# Patient Record
(demographics unavailable — no encounter records)

---

## 2024-10-22 NOTE — CARDIOLOGY SUMMARY
[de-identified] :  Echo 6/5/24: 1. Left ventricular cavity is normal in size. Left ventricular wall thickness is normal. Left ventricular systolic function is normal with an ejection fraction visually estimated at 60 to 65 %. There are no regional wall motion abnormalities seen. 2. Normal left ventricular diastolic function, with normal filling pressure. 3. Based on visual assessment, the right ventricle appears mildly enlarged. normal systolic function. 4. The right atrium is mildly dilated. 5. No significant valvular disease. 6. Estimated pulmonary artery systolic pressure is 28 mmHg, consistent with normal pulmonary artery pressure. 7. No pericardial effusion seen.   [de-identified] : Stress Echo: 10/14/24: 1. Normal exercise stress echocardiogram. with normal augmentation of left ventricular systolic function. 2. No exercise induced electrocardiographic evidence of myocardial ischemia. 3. Physiologic blood pressure response to exercise. 4. No significant exercise-induced arrhythmias. 5. Patient achieved 15.6 METs, which is consistent with excellent exercise capacity. ELECTROCARDIOGRAPHIC FINDINGS: Pre-Stress: At rest, the electrocardiogram revealed sinus rhythm. Post-Stress: There were no significant exercise induced arrhythmias noted. FOCUSED ECHOCARDIOGRAPHIC FINDINGS: Baseline Echocardiogram: Baseline/resting echocardiogram reveals normal left ventricular segmental wall motion and systolic function. At rest there were no left ventricular regional wall motion abnormalities observed. At rest, the baseline left ventricular ejection fraction was was estimated at approximately 60% (normal EF). Stress Echocardiogram: Stress echocardiogram reveals normal left ventricular segmental wall motion and systolic function. Immediate post-stress there were no regional wall motion abnormalities seen. Immediate post-stress the left ventricular ejection fraction was estimated at approximately 70% (normal EF).   Exercise Stress Test 5/4/2022: DADA for 8:12 min:s, Max. METS: 12:40. HR and BP with appropriate response to exercise. NO EVIDENCE OF EXERCISE INDUCED ISHCEMIA TO ATTAINED HR.  [de-identified] :   CT CORONARY SCORING 6/5/24: Coronary Arteries  Total: 897  LM: 98.4  LAD: 446  LCx: 113  RCA: 239 Cardiac:  1.  The calcium score is severe at 897 Agatston units, which is at the 97 percentile, adjusted for age, gender and race.  2.  Presence of aortic valve and aortic calcification.  Non-cardiac:  Aortic valve and thoracic aortic calcifications.  Right lower lobe nodule compatible with a calcified granuloma.

## 2024-10-22 NOTE — ASSESSMENT
[FreeTextEntry1] : 59M  Elevated Coronary Artery Calcium Score HLD Fhx: CAD  EKG NSR non-specific T wave inversions inferiorly Treadmill stress echo May 2024- no ischemia, 15.6 METS Coronary calcium Score 897  - continue high dose crestor 40mg - target LDL < 70.   Repeat lipids ~ Dec 2024  - continue aspirin 81mg given coronary plaque burden - discussed results of testing at length, he is asymptomatic with excellent functional capacity and normal LVEF/wall motion.  He does have a significant calcified plaque burden with a strong family hx of CAD/MI.  Recommend coronary CTA to rule out critical LM/LAD/3-vessel disease.    - return post testing

## 2024-10-22 NOTE — CARDIOLOGY SUMMARY
[de-identified] :  Echo 6/5/24: 1. Left ventricular cavity is normal in size. Left ventricular wall thickness is normal. Left ventricular systolic function is normal with an ejection fraction visually estimated at 60 to 65 %. There are no regional wall motion abnormalities seen. 2. Normal left ventricular diastolic function, with normal filling pressure. 3. Based on visual assessment, the right ventricle appears mildly enlarged. normal systolic function. 4. The right atrium is mildly dilated. 5. No significant valvular disease. 6. Estimated pulmonary artery systolic pressure is 28 mmHg, consistent with normal pulmonary artery pressure. 7. No pericardial effusion seen.   [de-identified] : Stress Echo: 10/14/24: 1. Normal exercise stress echocardiogram. with normal augmentation of left ventricular systolic function. 2. No exercise induced electrocardiographic evidence of myocardial ischemia. 3. Physiologic blood pressure response to exercise. 4. No significant exercise-induced arrhythmias. 5. Patient achieved 15.6 METs, which is consistent with excellent exercise capacity. ELECTROCARDIOGRAPHIC FINDINGS: Pre-Stress: At rest, the electrocardiogram revealed sinus rhythm. Post-Stress: There were no significant exercise induced arrhythmias noted. FOCUSED ECHOCARDIOGRAPHIC FINDINGS: Baseline Echocardiogram: Baseline/resting echocardiogram reveals normal left ventricular segmental wall motion and systolic function. At rest there were no left ventricular regional wall motion abnormalities observed. At rest, the baseline left ventricular ejection fraction was was estimated at approximately 60% (normal EF). Stress Echocardiogram: Stress echocardiogram reveals normal left ventricular segmental wall motion and systolic function. Immediate post-stress there were no regional wall motion abnormalities seen. Immediate post-stress the left ventricular ejection fraction was estimated at approximately 70% (normal EF).   Exercise Stress Test 5/4/2022: DADA for 8:12 min:s, Max. METS: 12:40. HR and BP with appropriate response to exercise. NO EVIDENCE OF EXERCISE INDUCED ISHCEMIA TO ATTAINED HR.  [de-identified] :   CT CORONARY SCORING 6/5/24: Coronary Arteries  Total: 897  LM: 98.4  LAD: 446  LCx: 113  RCA: 239 Cardiac:  1.  The calcium score is severe at 897 Agatston units, which is at the 97 percentile, adjusted for age, gender and race.  2.  Presence of aortic valve and aortic calcification.  Non-cardiac:  Aortic valve and thoracic aortic calcifications.  Right lower lobe nodule compatible with a calcified granuloma.

## 2024-10-22 NOTE — HISTORY OF PRESENT ILLNESS
[FreeTextEntry1] : 58 M patient of Dr. Kenney here today to transition care. Patient established care with Dr. Kenney because of HLD and a very strong family history of early coronary artery disease, especially his father who underwent bypass and his sister who had a heart attack at a very young age. The patient himself has been entirely asymptomatic as of last visit with Dr. Kenney.   Very active no limitations.  Has had a couple episodes where he felt panicky/sob during  drills with mask on, smoke filled room, wearing heavy equipment.  Self resolved.  He thinks it was panic related.  Known anxiety disorder, follows with therapist weekly.  Exercises regularly without these symptoms. Tolerates aspirin/crestor 20mg no issues Weight has been stable.  Working on losing weight, diet is major obstacle.    10/22/24: returns post testing to discuss calcium score and stress test.  no further episodes of chest pain.  exercises regularly without limitation.

## 2024-12-19 NOTE — PHYSICAL EXAM
[Well Developed] : well developed [Well Nourished] : well nourished [No Acute Distress] : no acute distress [Normal Conjunctiva] : normal conjunctiva [Normal Venous Pressure] : normal venous pressure [No Carotid Bruit] : no carotid bruit [Normal S1, S2] : normal S1, S2 [No Murmur] : no murmur [No Rub] : no rub [No Gallop] : no gallop [Clear Lung Fields] : clear lung fields [Good Air Entry] : good air entry [No Respiratory Distress] : no respiratory distress  [Soft] : abdomen soft [Non Tender] : non-tender [No Masses/organomegaly] : no masses/organomegaly [Normal Bowel Sounds] : normal bowel sounds [Moves all extremities] : moves all extremities [No Focal Deficits] : no focal deficits [Normal Speech] : normal speech [No ulcers] : no ulcers [No edema] : no edema [No varicosities] : no varicosities [No chronic venous stasis changes] : no chronic venous stasis changes [No cyanosis] : no cyanosis [No rashes] : no rashes [Normal peripheral pulses] : : normal peripheral pulses [de-identified] : R radial access site intact, normal pulses, mild ecchymoses

## 2024-12-19 NOTE — CARDIOLOGY SUMMARY
[de-identified] :  Echo 6/5/24: 1. Left ventricular cavity is normal in size. Left ventricular wall thickness is normal. Left ventricular systolic function is normal with an ejection fraction visually estimated at 60 to 65 %. There are no regional wall motion abnormalities seen. 2. Normal left ventricular diastolic function, with normal filling pressure. 3. Based on visual assessment, the right ventricle appears mildly enlarged. normal systolic function. 4. The right atrium is mildly dilated. 5. No significant valvular disease. 6. Estimated pulmonary artery systolic pressure is 28 mmHg, consistent with normal pulmonary artery pressure. 7. No pericardial effusion seen.   [de-identified] : Stress Echo: 10/14/24: 1. Normal exercise stress echocardiogram. with normal augmentation of left ventricular systolic function. 2. No exercise induced electrocardiographic evidence of myocardial ischemia. 3. Physiologic blood pressure response to exercise. 4. No significant exercise-induced arrhythmias. 5. Patient achieved 15.6 METs, which is consistent with excellent exercise capacity. ELECTROCARDIOGRAPHIC FINDINGS: Pre-Stress: At rest, the electrocardiogram revealed sinus rhythm. Post-Stress: There were no significant exercise induced arrhythmias noted. FOCUSED ECHOCARDIOGRAPHIC FINDINGS: Baseline Echocardiogram: Baseline/resting echocardiogram reveals normal left ventricular segmental wall motion and systolic function. At rest there were no left ventricular regional wall motion abnormalities observed. At rest, the baseline left ventricular ejection fraction was was estimated at approximately 60% (normal EF). Stress Echocardiogram: Stress echocardiogram reveals normal left ventricular segmental wall motion and systolic function. Immediate post-stress there were no regional wall motion abnormalities seen. Immediate post-stress the left ventricular ejection fraction was estimated at approximately 70% (normal EF).   Exercise Stress Test 5/4/2022: DADA for 8:12 min:s, Max. METS: 12:40. HR and BP with appropriate response to exercise. NO EVIDENCE OF EXERCISE INDUCED ISHCEMIA TO ATTAINED HR.  [de-identified] : CTA Heart 11/12/24: Total Agatston Score: 793.  LM Agatston Score: 95.  LAD Agatston Score: 422.  LCX Agatston Score: 99.  RCA Agatston Score: 177. Cardiac:  1.  The calcium score is severe at 793 Agatston units, which is at the 96 percentile, adjusted for age, gender and race.  2.  Severe stenosis in distal RCA, and mid LCx.  3.  Moderate to severe stenosis in proximal LCx.  4.  Moderate stenosis in mid LAD.  5.  Calcific plaque obscures the lumen in mid LAD, precluding stenosis evaluation.  6.  Remaining segments demonstrate non obstructive coronary artery disease.  Based on the coronary findings, FFR-CT Coronary Analysis ([employer]) is recommended to define the physiologic significance of obstructive coronary disease. After insurance approval, CCTA images will be sent for analysis. FFR CT results are then generally available within 24 hours(if technical quality of the images is sufficient).  Non-cardiac:  Aortic valve calcification.  A couple of hypervascular liver foci measuring up to 7 mm. Follow-up MRI with hepatic mass protocol/with and without IV contrast is recommended.  PCI of dLCx at [facility] 12/12/24: Successful AngioFFR positive for obstruction. [id number] Xience MARYBEL. 0% residual stenosis.   CT CORONARY SCORING 6/5/24: Coronary Arteries  Total: 897  LM: 98.4  LAD: 446  LCx: 113  RCA: 239 Cardiac:  1.  The calcium score is severe at 897 Agatston units, which is at the 97 percentile, adjusted for age, gender and race.  2.  Presence of aortic valve and aortic calcification.  Non-cardiac:  Aortic valve and thoracic aortic calcifications.  Right lower lobe nodule compatible with a calcified granuloma.

## 2024-12-19 NOTE — ASSESSMENT
[FreeTextEntry1] : 59M  Severe CAD s/p distal LCX PCI Dec 2024 at [facility]  Elevated Coronary Artery Calcium Score HLD Fhx: CAD  EKG for CAD - sinus bradycardia low 40s Treadmill Stress Echo May 2024- no ischemia, 15.6 METS Coronary calcium Score 897  Reviewed recent coronary angiogram.   S/p LCX PCI.    - continue high dose crestor 40mg - LDL 58, HDL 54, TG 88, Total 130 - continue aspirin 81mg and plavix 75mg given coronary plaque burden recent LCX PCI

## 2024-12-19 NOTE — HISTORY OF PRESENT ILLNESS
[FreeTextEntry1] : 59M patient of Dr. Kenney here today to transition care. Patient established care with Dr. Kenney because of HLD and a very strong family history of early coronary artery disease, especially his father who underwent bypass and his sister who had a heart attack at a very young age. The patient himself has been entirely asymptomatic as of last visit with Dr. Kenney.   Very active no limitations.  Has had a couple episodes where he felt panicky/sob during  drills with mask on, smoke filled room, wearing heavy equipment.  Self resolved.  He thinks it was panic related.  Known anxiety disorder, follows with therapist weekly.  Exercises regularly without these symptoms. Tolerates aspirin/crestor 20mg no issues Weight has been stable.  Working on losing weight, diet is major obstacle.    10/22/24: returns post testing to discuss calcium score and stress test.  no further episodes of chest pain.  exercises regularly without limitation.   12/19/24:  since last visit had coronary angiogram, severe LCX s/p PCI.   He feels good, no limitations.   Exercising regularly.   R radial artery access no complicatrions.

## 2024-12-30 NOTE — ASSESSMENT
[FreeTextEntry1] : CPE- healthy person up-to-date on health maintenance measures. Also up-to-date on recommended vaccines. Urged weight loss through proper diet and regular exercising.  Mixed hyperlipidemia - Check profile. Congratulated on further wt loss. Low chol diet re-reviewed.  Continue rosuvastatin 40 mg daily.  Last LDL at goal 58  December 2024.    Sinus bradycardia - Bradycardia chronic and likely physiologic as pt is triathalon participant. No sx's. Check ekg.  Coronary artery disease- 12/12/2024 status post left circumflex PCI.  He continues without symptoms. Numerous cardiology notes reviewed with patient today. Plan is to continue present meds. Will check LFTs, TSH, CBC, and PSA as other labs recently checked 2 weeks ago.  Give Pneumovax 23 for CAD.  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  Declines HIV.  Risks/benefits of Pneumovax 23 vaccine d/w patient and patient understands and accepts.

## 2024-12-30 NOTE — HEALTH RISK ASSESSMENT
[Never] : Never [Yes] : Yes [Monthly or less (1 pt)] : Monthly or less (1 point) [1 or 2 (0 pts)] : 1 or 2 (0 points) [Never (0 pts)] : Never (0 points) [No] : In the past 12 months have you used drugs other than those required for medical reasons? No [Audit-CScore] : 1 [HIV test declined] : HIV test declined [HIVDate] : 12/24

## 2024-12-30 NOTE — PHYSICAL EXAM
[Well Nourished] : well nourished [Well Developed] : well developed [Well-Appearing] : well-appearing [Normal Sclera/Conjunctiva] : normal sclera/conjunctiva [PERRL] : pupils equal round and reactive to light [EOMI] : extraocular movements intact [Normal Outer Ear/Nose] : the outer ears and nose were normal in appearance [Normal Oropharynx] : the oropharynx was normal [No JVD] : no jugular venous distention [No Lymphadenopathy] : no lymphadenopathy [Supple] : supple [Thyroid Normal, No Nodules] : the thyroid was normal and there were no nodules present [No Respiratory Distress] : no respiratory distress  [No Accessory Muscle Use] : no accessory muscle use [Clear to Auscultation] : lungs were clear to auscultation bilaterally [Normal Rate] : normal rate  [Regular Rhythm] : with a regular rhythm [Normal S1, S2] : normal S1 and S2 [No Murmur] : no murmur heard [No Carotid Bruits] : no carotid bruits [No Abdominal Bruit] : a ~M bruit was not heard ~T in the abdomen [No Varicosities] : no varicosities [Pedal Pulses Present] : the pedal pulses are present [No Edema] : there was no peripheral edema [No Palpable Aorta] : no palpable aorta [No Extremity Clubbing/Cyanosis] : no extremity clubbing/cyanosis [Soft] : abdomen soft [Non Tender] : non-tender [Non-distended] : non-distended [No Masses] : no abdominal mass palpated [No HSM] : no HSM [Normal Bowel Sounds] : normal bowel sounds [No CVA Tenderness] : no CVA  tenderness [No Spinal Tenderness] : no spinal tenderness [No Joint Swelling] : no joint swelling [Grossly Normal Strength/Tone] : grossly normal strength/tone [No Rash] : no rash [Coordination Grossly Intact] : coordination grossly intact [No Focal Deficits] : no focal deficits [Normal Gait] : normal gait [Deep Tendon Reflexes (DTR)] : deep tendon reflexes were 2+ and symmetric [Normal Affect] : the affect was normal [Normal Insight/Judgement] : insight and judgment were intact [de-identified] : no xanthelasmas [de-identified] : +healed laparascopic scars and horizontal incision

## 2024-12-30 NOTE — HISTORY OF PRESENT ILLNESS
[FreeTextEntry1] : 59-year-old man here for CPE Had coronary stent placed 12/12/2024 [de-identified] : Feels well. Had stent put in stent put in 3 weeks ago. Lost 6 pounds in 1 year.

## 2024-12-30 NOTE — REVIEW OF SYSTEMS
[Recent Change In Weight] : ~T recent weight change [Hearing Loss] : hearing loss [Frequency] : frequency [Anxiety] : anxiety [Fever] : no fever [Chills] : no chills [Vision Problems] : no vision problems [Chest Pain] : no chest pain [Palpitations] : no palpitations [Shortness Of Breath] : no shortness of breath [Wheezing] : no wheezing [Cough] : no cough [Dyspnea on Exertion] : not dyspnea on exertion [Abdominal Pain] : no abdominal pain [Nausea] : no nausea [Vomiting] : no vomiting [Heartburn] : no heartburn [Dysuria] : no dysuria [Nocturia] : no nocturia [Hematuria] : no hematuria [Muscle Weakness] : no muscle weakness [Joint Swelling] : no joint swelling [Skin Rash] : no skin rash [Headache] : no headache [Dizziness] : no dizziness [Confusion] : no confusion [Memory Loss] : no memory loss [Depression] : no depression [Easy Bleeding] : no easy bleeding [FreeTextEntry3] : last eye exam 7/2024

## 2025-05-01 NOTE — PHYSICAL EXAM
[General Appearance - Alert] : alert [General Appearance - In No Acute Distress] : in no acute distress [Jugular Venous Distention Increased] : there was no jugular-venous distention [] : no respiratory distress [Respiration, Rhythm And Depth] : normal respiratory rhythm and effort [Exaggerated Use Of Accessory Muscles For Inspiration] : no accessory muscle use [Auscultation Breath Sounds / Voice Sounds] : lungs were clear to auscultation bilaterally [Heart Rate And Rhythm] : heart rate was normal and rhythm regular [Heart Sounds] : normal S1 and S2 [Abdomen Soft] : soft [Abdomen Tenderness] : non-tender [Oriented To Time, Place, And Person] : oriented to person, place, and time

## 2025-05-01 NOTE — HISTORY OF PRESENT ILLNESS
[FreeTextEntry1] : He has hx of hip pain from OA, CAD s/p with stent Dec 2024 at [facility]. Kidney donor in 2017, with normal kidney function on FU at [place], post donation  on 1/2018 cr 1.4, egfr 60s.  eGFR high 50s- 60s since 2018. No available lab prior to  kidney donation. Most recent lab Dec 2024, cr 1.3, egfr 63.  Chronic use of advil for hip pain. Hip joint injection postponed due to patient being on DAPt. very active, golfing.

## 2025-05-01 NOTE — REVIEW OF SYSTEMS
[Fever] : no fever [Chills] : no chills [Chest Pain] : no chest pain [Palpitations] : no palpitations [Cough] : no cough [SOB on Exertion] : no shortness of breath during exertion [Abdominal Pain] : no abdominal pain [Vomiting] : no vomiting [Constipation] : no constipation [Diarrhea] : no diarrhea [Dysuria] : no dysuria [Incontinence] : no incontinence [Hesitancy] : no urinary hesitancy [Nocturia] : no nocturia [Dizziness] : no dizziness

## 2025-05-01 NOTE — ASSESSMENT
[FreeTextEntry1] : # Kidney Donor 2017 #CKD stage 2-3 -Most recent lab Dec 2024, cr 1.3, egfr 63. at baseline  -Avoid NSAID, use alternative like tylenol, lidocaine patch, PT. Advise to discuss with cardio on when anti plt can be held for procedure/surgery  - Keep good hydration, fluid intake >2l/d, he is physically very active  - Renal US  -lab cbc, cmp, ua, upcr, vit d, pth, phos, uric   # HLD  #CAD  -cont rosuvastatin 40 mg daily

## 2025-06-10 NOTE — PHYSICAL EXAM
[Well Developed] : well developed [Well Nourished] : well nourished [No Acute Distress] : no acute distress [Normal Conjunctiva] : normal conjunctiva [Normal Venous Pressure] : normal venous pressure [No Carotid Bruit] : no carotid bruit [Normal S1, S2] : normal S1, S2 [No Murmur] : no murmur [No Rub] : no rub [No Gallop] : no gallop [Clear Lung Fields] : clear lung fields [Good Air Entry] : good air entry [No Respiratory Distress] : no respiratory distress  [Soft] : abdomen soft [Non Tender] : non-tender [No Masses/organomegaly] : no masses/organomegaly [Normal Bowel Sounds] : normal bowel sounds [Moves all extremities] : moves all extremities [Normal Speech] : normal speech [No Focal Deficits] : no focal deficits [No edema] : no edema [No ulcers] : no ulcers [No varicosities] : no varicosities [No cyanosis] : no cyanosis [No chronic venous stasis changes] : no chronic venous stasis changes [No rashes] : no rashes [Normal peripheral pulses] : : normal peripheral pulses [de-identified] : R radial access site intact, normal pulses, mild ecchymoses

## 2025-06-10 NOTE — CARDIOLOGY SUMMARY
[de-identified] :  Echo 6/5/24: 1. Left ventricular cavity is normal in size. Left ventricular wall thickness is normal. Left ventricular systolic function is normal with an ejection fraction visually estimated at 60 to 65 %. There are no regional wall motion abnormalities seen. 2. Normal left ventricular diastolic function, with normal filling pressure. 3. Based on visual assessment, the right ventricle appears mildly enlarged. normal systolic function. 4. The right atrium is mildly dilated. 5. No significant valvular disease. 6. Estimated pulmonary artery systolic pressure is 28 mmHg, consistent with normal pulmonary artery pressure. 7. No pericardial effusion seen.   [de-identified] : Stress Echo: 10/14/24: 1. Normal exercise stress echocardiogram. with normal augmentation of left ventricular systolic function. 2. No exercise induced electrocardiographic evidence of myocardial ischemia. 3. Physiologic blood pressure response to exercise. 4. No significant exercise-induced arrhythmias. 5. Patient achieved 15.6 METs, which is consistent with excellent exercise capacity. ELECTROCARDIOGRAPHIC FINDINGS: Pre-Stress: At rest, the electrocardiogram revealed sinus rhythm. Post-Stress: There were no significant exercise induced arrhythmias noted. FOCUSED ECHOCARDIOGRAPHIC FINDINGS: Baseline Echocardiogram: Baseline/resting echocardiogram reveals normal left ventricular segmental wall motion and systolic function. At rest there were no left ventricular regional wall motion abnormalities observed. At rest, the baseline left ventricular ejection fraction was was estimated at approximately 60% (normal EF). Stress Echocardiogram: Stress echocardiogram reveals normal left ventricular segmental wall motion and systolic function. Immediate post-stress there were no regional wall motion abnormalities seen. Immediate post-stress the left ventricular ejection fraction was estimated at approximately 70% (normal EF).   Exercise Stress Test 5/4/2022: DADA for 8:12 min:s, Max. METS: 12:40. HR and BP with appropriate response to exercise. NO EVIDENCE OF EXERCISE INDUCED ISHCEMIA TO ATTAINED HR.  [de-identified] : CTA Heart 11/12/24: Total Agatston Score: 793.  LM Agatston Score: 95.  LAD Agatston Score: 422.  LCX Agatston Score: 99.  RCA Agatston Score: 177. Cardiac:  1.  The calcium score is severe at 793 Agatston units, which is at the 96 percentile, adjusted for age, gender and race.  2.  Severe stenosis in distal RCA, and mid LCx.  3.  Moderate to severe stenosis in proximal LCx.  4.  Moderate stenosis in mid LAD.  5.  Calcific plaque obscures the lumen in mid LAD, precluding stenosis evaluation.  6.  Remaining segments demonstrate non obstructive coronary artery disease.  Based on the coronary findings, FFR-CT Coronary Analysis ([employer]) is recommended to define the physiologic significance of obstructive coronary disease. After insurance approval, CCTA images will be sent for analysis. FFR CT results are then generally available within 24 hours(if technical quality of the images is sufficient).  Non-cardiac:  Aortic valve calcification.  A couple of hypervascular liver foci measuring up to 7 mm. Follow-up MRI with hepatic mass protocol/with and without IV contrast is recommended.  PCI of dLCx at [facility] 12/12/24: Successful AngioFFR positive for obstruction. [id number] Xience MARYBEL. 0% residual stenosis.   CT CORONARY SCORING 6/5/24: Coronary Arteries  Total: 897  LM: 98.4  LAD: 446  LCx: 113  RCA: 239 Cardiac:  1.  The calcium score is severe at 897 Agatston units, which is at the 97 percentile, adjusted for age, gender and race.  2.  Presence of aortic valve and aortic calcification.  Non-cardiac:  Aortic valve and thoracic aortic calcifications.  Right lower lobe nodule compatible with a calcified granuloma.

## 2025-06-10 NOTE — ASSESSMENT
[FreeTextEntry1] : 59M  Severe CAD s/p distal LCX PCI Dec 2024 at [facility]  Elevated Coronary Artery Calcium Score HLD Fhx: CAD  EKG for CAD - sinus bradycardia 40s unchanged Treadmill Stress Echo May 2024- no ischemia, 15.6 METS Coronary calcium Score 897   - no signs of ischemia or heart failure - continue high dose crestor 40mg for HLD/CAD - LDL 58, HDL 54, TG 88, Total 130 - continue aspirin 81mg and plavix 75mg given coronary plaque burden and LCX PCI - return for preop likely in 6 months

## 2025-06-10 NOTE — HISTORY OF PRESENT ILLNESS
[FreeTextEntry1] : 59M patient of Dr. Kenney here today to transition care. Patient established care with Dr. Kenney because of HLD and a very strong family history of early coronary artery disease, especially his father who underwent bypass and his sister who had a heart attack at a very young age. The patient himself has been entirely asymptomatic as of last visit with Dr. Kenney.   Very active no limitations.  Has had a couple episodes where he felt panicky/sob during  drills with mask on, smoke filled room, wearing heavy equipment.  Self-resolved.  He thinks it was panic related.  Known anxiety disorder, follows with therapist weekly.  Exercises regularly without these symptoms. Tolerates aspirin/crestor 20mg no issues Weight has been stable.  Working on losing weight, diet is major obstacle.    10/22/24: returns post testing to discuss calcium score and stress test.  no further episodes of chest pain.  exercises regularly without limitation.  12/19/24:  since last visit had coronary angiogram, severe LCX s/p PCI.   He feels good, no limitations.   Exercising regularly.   R radial artery access no complications.   6/10/25:  feeling good, very active, no chest pain or sob.  limited somewhat by hip arthritis, will need a hip replacement

## 2025-07-30 NOTE — HISTORY OF PRESENT ILLNESS
[FreeTextEntry1] :  Oliver is a 60 years old patient referred by  for BPH. Patient with mild LUTS mostly Nocturia. He has good stream, empties well, occasional post void dribbling. No UTI, No gross hematuria. No  Hx of urinary retention/catherization. No medications for BPH, no prostate biopsies or  Procedures for BPH. No abdominalor  Pelvic Surgery. No  Neurological/ Back issues. Bowel movement. 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  Slight diffuse increased right renal cortical parenchymal echogenicity. No right hydronephrosis.  Grossly unremarkable appearance of urinary bladder.  Mildly prominent prostate.  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 - Smokes Marijuana